# Patient Record
Sex: MALE | Race: WHITE | Employment: FULL TIME | URBAN - METROPOLITAN AREA
[De-identification: names, ages, dates, MRNs, and addresses within clinical notes are randomized per-mention and may not be internally consistent; named-entity substitution may affect disease eponyms.]

---

## 2019-04-30 ENCOUNTER — HOSPITAL ENCOUNTER (OUTPATIENT)
Age: 49
Setting detail: SPECIMEN
Discharge: HOME | End: 2019-04-30
Payer: COMMERCIAL

## 2019-04-30 PROCEDURE — 88342 IMHCHEM/IMCYTCHM 1ST ANTB: CPT

## 2019-05-24 ENCOUNTER — HOSPITAL ENCOUNTER (OUTPATIENT)
Dept: GENERAL RADIOLOGY | Age: 49
Discharge: HOME | End: 2019-05-24
Payer: COMMERCIAL

## 2019-05-24 DIAGNOSIS — K92.2 ACUTE GASTROINTESTINAL HEMORRHAGE: ICD-10-CM

## 2019-05-24 PROCEDURE — 74250 X-RAY XM SM INT 1CNTRST STD: CPT

## 2021-07-09 ENCOUNTER — HOSPITAL ENCOUNTER (EMERGENCY)
Age: 51
Discharge: HOME | End: 2021-07-10
Attending: EMERGENCY MEDICINE
Payer: COMMERCIAL

## 2021-07-09 ENCOUNTER — APPOINTMENT (OUTPATIENT)
Dept: GENERAL RADIOLOGY | Age: 51
End: 2021-07-09
Payer: COMMERCIAL

## 2021-07-09 ENCOUNTER — APPOINTMENT (OUTPATIENT)
Dept: CT IMAGING | Age: 51
End: 2021-07-09
Payer: COMMERCIAL

## 2021-07-09 DIAGNOSIS — I26.99 PULMONARY INFARCTION (HCC): ICD-10-CM

## 2021-07-09 DIAGNOSIS — I26.94 MULTIPLE SUBSEGMENTAL PULMONARY EMBOLI WITHOUT ACUTE COR PULMONALE (HCC): Primary | ICD-10-CM

## 2021-07-09 LAB
ALBUMIN SERPL-MCNC: 3.9 GM/DL (ref 3.4–5)
ALP BLD-CCNC: 81 IU/L (ref 40–129)
ALT SERPL-CCNC: 16 U/L (ref 10–40)
ANION GAP SERPL CALCULATED.3IONS-SCNC: 13 MMOL/L (ref 4–16)
AST SERPL-CCNC: 18 IU/L (ref 15–37)
BASOPHILS ABSOLUTE: 0.1 K/CU MM
BASOPHILS RELATIVE PERCENT: 0.9 % (ref 0–1)
BILIRUB SERPL-MCNC: 0.3 MG/DL (ref 0–1)
BUN BLDV-MCNC: 12 MG/DL (ref 6–23)
CALCIUM SERPL-MCNC: 9.2 MG/DL (ref 8.3–10.6)
CHLORIDE BLD-SCNC: 101 MMOL/L (ref 99–110)
CO2: 24 MMOL/L (ref 21–32)
CREAT SERPL-MCNC: 0.8 MG/DL (ref 0.9–1.3)
D DIMER: 2.14 UG/ML (FEU)
DIFFERENTIAL TYPE: ABNORMAL
EOSINOPHILS ABSOLUTE: 0.1 K/CU MM
EOSINOPHILS RELATIVE PERCENT: 1.5 % (ref 0–3)
GFR AFRICAN AMERICAN: >60 ML/MIN/1.73M2
GFR NON-AFRICAN AMERICAN: >60 ML/MIN/1.73M2
GLUCOSE BLD-MCNC: 126 MG/DL (ref 70–99)
HCT VFR BLD CALC: 37.8 % (ref 42–52)
HEMOGLOBIN: 11.9 GM/DL (ref 13.5–18)
IMMATURE NEUTROPHIL %: 0.3 % (ref 0–0.43)
LACTATE: 0.8 MMOL/L (ref 0.4–2)
LIPASE: 24 IU/L (ref 13–60)
LYMPHOCYTES ABSOLUTE: 1.6 K/CU MM
LYMPHOCYTES RELATIVE PERCENT: 20.5 % (ref 24–44)
MCH RBC QN AUTO: 25.5 PG (ref 27–31)
MCHC RBC AUTO-ENTMCNC: 31.5 % (ref 32–36)
MCV RBC AUTO: 81.1 FL (ref 78–100)
MONOCYTES ABSOLUTE: 0.8 K/CU MM
MONOCYTES RELATIVE PERCENT: 9.9 % (ref 0–4)
PDW BLD-RTO: 15.8 % (ref 11.7–14.9)
PLATELET # BLD: 223 K/CU MM (ref 140–440)
PMV BLD AUTO: 9.7 FL (ref 7.5–11.1)
POTASSIUM SERPL-SCNC: 3.6 MMOL/L (ref 3.5–5.1)
PRO-BNP: 18.53 PG/ML
RBC # BLD: 4.66 M/CU MM (ref 4.6–6.2)
SEGMENTED NEUTROPHILS ABSOLUTE COUNT: 5.2 K/CU MM
SEGMENTED NEUTROPHILS RELATIVE PERCENT: 66.9 % (ref 36–66)
SODIUM BLD-SCNC: 138 MMOL/L (ref 135–145)
TOTAL IMMATURE NEUTOROPHIL: 0.02 K/CU MM
TOTAL PROTEIN: 7.1 GM/DL (ref 6.4–8.2)
TROPONIN T: <0.01 NG/ML
WBC # BLD: 7.8 K/CU MM (ref 4–10.5)

## 2021-07-09 PROCEDURE — 83880 ASSAY OF NATRIURETIC PEPTIDE: CPT

## 2021-07-09 PROCEDURE — 71275 CT ANGIOGRAPHY CHEST: CPT

## 2021-07-09 PROCEDURE — 83605 ASSAY OF LACTIC ACID: CPT

## 2021-07-09 PROCEDURE — 85379 FIBRIN DEGRADATION QUANT: CPT

## 2021-07-09 PROCEDURE — 74177 CT ABD & PELVIS W/CONTRAST: CPT

## 2021-07-09 PROCEDURE — 80053 COMPREHEN METABOLIC PANEL: CPT

## 2021-07-09 PROCEDURE — 84484 ASSAY OF TROPONIN QUANT: CPT

## 2021-07-09 PROCEDURE — 85025 COMPLETE CBC W/AUTO DIFF WBC: CPT

## 2021-07-09 PROCEDURE — 99285 EMERGENCY DEPT VISIT HI MDM: CPT

## 2021-07-09 PROCEDURE — 71045 X-RAY EXAM CHEST 1 VIEW: CPT

## 2021-07-09 PROCEDURE — 96372 THER/PROPH/DIAG INJ SC/IM: CPT

## 2021-07-09 PROCEDURE — 6360000004 HC RX CONTRAST MEDICATION: Performed by: EMERGENCY MEDICINE

## 2021-07-09 PROCEDURE — 83690 ASSAY OF LIPASE: CPT

## 2021-07-09 RX ORDER — PANTOPRAZOLE SODIUM 40 MG/10ML
40 INJECTION, POWDER, LYOPHILIZED, FOR SOLUTION INTRAVENOUS DAILY
COMMUNITY

## 2021-07-09 RX ADMIN — IOPAMIDOL 100 ML: 755 INJECTION, SOLUTION INTRAVENOUS at 23:06

## 2021-07-09 ASSESSMENT — PAIN DESCRIPTION - ORIENTATION: ORIENTATION: RIGHT

## 2021-07-09 ASSESSMENT — PAIN SCALES - GENERAL: PAINLEVEL_OUTOF10: 3

## 2021-07-09 ASSESSMENT — PAIN DESCRIPTION - LOCATION: LOCATION: FLANK

## 2021-07-09 ASSESSMENT — PAIN DESCRIPTION - PAIN TYPE: TYPE: ACUTE PAIN

## 2021-07-09 ASSESSMENT — PAIN DESCRIPTION - DESCRIPTORS: DESCRIPTORS: ACHING

## 2021-07-10 VITALS
WEIGHT: 220 LBS | DIASTOLIC BLOOD PRESSURE: 80 MMHG | RESPIRATION RATE: 18 BRPM | SYSTOLIC BLOOD PRESSURE: 115 MMHG | TEMPERATURE: 98.5 F | BODY MASS INDEX: 31.5 KG/M2 | HEIGHT: 70 IN | OXYGEN SATURATION: 95 % | HEART RATE: 77 BPM

## 2021-07-10 LAB
SARS-COV-2, NAAT: NOT DETECTED
SOURCE: NORMAL

## 2021-07-10 PROCEDURE — 87635 SARS-COV-2 COVID-19 AMP PRB: CPT

## 2021-07-10 PROCEDURE — 6360000002 HC RX W HCPCS: Performed by: EMERGENCY MEDICINE

## 2021-07-10 RX ADMIN — ENOXAPARIN SODIUM 150 MG: 100 INJECTION SUBCUTANEOUS at 00:33

## 2021-07-10 ASSESSMENT — ENCOUNTER SYMPTOMS
CONSTIPATION: 0
NAUSEA: 0
ABDOMINAL PAIN: 1
WHEEZING: 0
EYE REDNESS: 0
RHINORRHEA: 0
DIARRHEA: 0
COUGH: 0
VOMITING: 0
SHORTNESS OF BREATH: 1
SORE THROAT: 0
BACK PAIN: 1

## 2021-07-10 NOTE — ED PROVIDER NOTES
Triage Chief Complaint:   Shortness of Breath    MIGDALIA Hill is a 49 y.o. male that presents with back pain and abdominal pain that initially started on Thursday evening. The back pain is directly behind the location of the abdominal pain in the right upper quadrant. He states he is also short of breath because it hurts if he takes a deep breath. He states this evening after eating dinner he started having intense right upper quadrant pain that radiated into his back and then into his right shoulder. It has been improving since it started but it was very intense initially and wife states he was pale when the pain was intense this evening. He denies any nausea, vomiting or fevers. No chest pain. No history abdominal surgery. He had a history of a kidney stone many years ago but this is not similar. He did have a DVT in 2019 that was unprovoked and was on Xarelto for 3 months but has had no other blood clots. ROS:   Review of Systems   Constitutional: Negative for chills and fever. HENT: Negative for congestion, rhinorrhea and sore throat. Eyes: Negative for redness and visual disturbance. Respiratory: Positive for shortness of breath. Negative for cough and wheezing. Cardiovascular: Negative for chest pain and leg swelling. Gastrointestinal: Positive for abdominal pain. Negative for constipation, diarrhea, nausea and vomiting. Abdominal distention: RUQ. Genitourinary: Negative for dysuria and frequency. Musculoskeletal: Positive for back pain (right back). Negative for arthralgias. Skin: Negative for rash and wound. Neurological: Negative for syncope and headaches. Psychiatric/Behavioral: Negative for hallucinations and suicidal ideas. Past Medical History:   Diagnosis Date    GERD (gastroesophageal reflux disease)      Past Surgical History:   Procedure Laterality Date    KNEE SURGERY       History reviewed. No pertinent family history.   Social History Socioeconomic History    Marital status: Single     Spouse name: Not on file    Number of children: Not on file    Years of education: Not on file    Highest education level: Not on file   Occupational History    Not on file   Tobacco Use    Smoking status: Former Smoker    Smokeless tobacco: Never Used   Substance and Sexual Activity    Alcohol use: Yes     Comment: Weekends    Drug use: Not on file    Sexual activity: Not on file   Other Topics Concern    Not on file   Social History Narrative    Not on file     Social Determinants of Health     Financial Resource Strain:     Difficulty of Paying Living Expenses:    Food Insecurity:     Worried About Running Out of Food in the Last Year:     [address] in the Last Year:    Transportation Needs:     Lack of Transportation (Medical):  Lack of Transportation (Non-Medical):    Physical Activity:     Days of Exercise per Week:     Minutes of Exercise per Session:    Stress:     Feeling of Stress :    Social Connections:     Frequency of Communication with Friends and Family:     Frequency of Social Gatherings with Friends and Family:     Attends [employer] Services:     Active Member of Clubs or Organizations:     Attends Club or Organization Meetings:     Marital Status:    Intimate Partner Violence:     Fear of Current or Ex-Partner:     Emotionally Abused:     Physically Abused:     Sexually Abused:      No current facility-administered medications for this encounter. Current Outpatient Medications   Medication Sig Dispense Refill    apixaban (ELIQUIS) 5 MG TABS tablet Take 2 tablets by mouth 2 times daily for 7 days 28 tablet 0    apixaban (ELIQUIS) 5 MG TABS tablet Take 1 tablet by mouth 2 times daily 60 tablet 0    pantoprazole (PROTONIX) 40 MG injection Infuse 40 mg intravenously daily      famotidine (PEPCID) 20 MG tablet Take 20 mg by mouth daily.        No Known Allergies    Nursing Notes Reviewed     Physical Exam:   ED Triage Vitals [07/09/21 2139]   Enc Vitals Group      BP (!) 164/87      Pulse 98      Resp       Temp       Temp src       SpO2       Weight 220 lb (99.8 kg)      Height 5' 10\" (1.778 m)      Head Circumference       Peak Flow       Pain Score       Pain Loc       Pain Edu? Excl. in [address]? /80   Pulse 77   Temp 98.5 °F (36.9 °C) (Oral)   Resp 18   Ht 5' 10\" (1.778 m)   Wt 220 lb (99.8 kg)   SpO2 95%   BMI 31.57 kg/m²   My pulse ox interpretation is - normal  Physical Exam  Vitals and nursing note reviewed. Constitutional:       General: He is not in acute distress. Appearance: Normal appearance. He is not diaphoretic. HENT:      Head: Normocephalic and atraumatic. Eyes:      General:         Right eye: No discharge. Left eye: No discharge. Conjunctiva/sclera: Conjunctivae normal.   Cardiovascular:      Rate and Rhythm: Normal rate and regular rhythm. Pulses: Normal pulses. Radial pulses are 2+ on the right side and 2+ on the left side. Pulmonary:      Effort: Pulmonary effort is normal. No respiratory distress. Breath sounds: Normal breath sounds. No wheezing or rales. Abdominal:      General: There is no distension. Tenderness: There is abdominal tenderness (minimal in the RUQ). There is no guarding or rebound. Musculoskeletal:         General: No swelling or tenderness. Normal range of motion. Back:    Skin:     General: Skin is warm and dry. Neurological:      General: No focal deficit present. Mental Status: He is alert. Cranial Nerves: No cranial nerve deficit.    Psychiatric:         Mood and Affect: Mood normal.         Behavior: Behavior normal.         I have reviewed and interpreted all of the currently available lab results from this visit (if applicable):  Results for orders placed or performed during the hospital encounter of 07/09/21   COVID-19, Rapid    Specimen: Nasopharyngeal   Result Value Ref Range    Source THROAT     SARS-CoV-2, NAAT NOT DETECTED NOT DETECTED   CBC Auto Differential   Result Value Ref Range    WBC 7.8 4.0 - 10.5 K/CU MM    RBC 4.66 4.6 - 6.2 M/CU MM    Hemoglobin 11.9 (L) 13.5 - 18.0 GM/DL    Hematocrit 37.8 (L) 42 - 52 %    MCV 81.1 78 - 100 FL    MCH 25.5 (L) 27 - 31 PG    MCHC 31.5 (L) 32.0 - 36.0 %    RDW 15.8 (H) 11.7 - 14.9 %    Platelets 598 589 - 991 K/CU MM    MPV 9.7 7.5 - 11.1 FL    Differential Type AUTOMATED DIFFERENTIAL     Segs Relative 66.9 (H) 36 - 66 %    Lymphocytes % 20.5 (L) 24 - 44 %    Monocytes % 9.9 (H) 0 - 4 %    Eosinophils % 1.5 0 - 3 %    Basophils % 0.9 0 - 1 %    Segs Absolute 5.2 K/CU MM    Lymphocytes Absolute 1.6 K/CU MM    Monocytes Absolute 0.8 K/CU MM    Eosinophils Absolute 0.1 K/CU MM    Basophils Absolute 0.1 K/CU MM    Immature Neutrophil % 0.3 0 - 0.43 %    Total Immature Neutrophil 0.02 K/CU MM   Comprehensive Metabolic Panel w/ Reflex to MG   Result Value Ref Range    Sodium 138 135 - 145 MMOL/L    Potassium 3.6 3.5 - 5.1 MMOL/L    Chloride 101 99 - 110 mMol/L    CO2 24 21 - 32 MMOL/L    BUN 12 6 - 23 MG/DL    CREATININE 0.8 (L) 0.9 - 1.3 MG/DL    Glucose 126 (H) 70 - 99 MG/DL    Calcium 9.2 8.3 - 10.6 MG/DL    Albumin 3.9 3.4 - 5.0 GM/DL    Total Protein 7.1 6.4 - 8.2 GM/DL    Total Bilirubin 0.3 0.0 - 1.0 MG/DL    ALT 16 10 - 40 U/L    AST 18 15 - 37 IU/L    Alkaline Phosphatase 81 40 - 129 IU/L    GFR Non-African American >60 >60 mL/min/1.73m2    GFR African American >60 >60 mL/min/1.73m2    Anion Gap 13 4 - 16   Troponin   Result Value Ref Range    Troponin T <0.010 <0.01 NG/ML   Brain Natriuretic Peptide   Result Value Ref Range    Pro-BNP 18.53 <300 PG/ML   Lactic Acid, Plasma   Result Value Ref Range    Lactate 0.8 0.4 - 2.0 mMOL/L   Lipase   Result Value Ref Range    Lipase 24 13 - 60 IU/L   D-Dimer, Rapid   Result Value Ref Range    D-Dimer, Quant 2.14 (H) <0.47 ug/mL (FEU)      Radiographs (if obtained):  [] The following radiograph was interpreted by myself in the absence of a radiologist:  [x]Radiologist's Report Reviewed:  CT ABDOMEN PELVIS W IV CONTRAST Additional Contrast? None   Preliminary Result   1. Bilateral pulmonary emboli with right lower lobe pulmonary infarction. No   CT evidence of right heart strain. 2. No acute intra-abdominal abnormality. Findings were communicated to Dr. Medina Miranda on 07/09/2021 at 11:33 p.m. CTA PULMONARY W CONTRAST   Preliminary Result   1. Bilateral pulmonary emboli with right lower lobe pulmonary infarction. No   CT evidence of right heart strain. 2. No acute intra-abdominal abnormality. Findings were communicated to Dr. Medina Miranda on 07/09/2021 at 11:33 p.m. XR CHEST PORTABLE   Final Result   No acute cardiopulmonary abnormality. EKG (if obtained): (All EKG's are interpreted by myself in the absence of a cardiologist)  Normal sinus rhythm with a rate of 99. WA interval 152, QRS 88, QTc 431. No ST elevations or depressions. Normal T waves. Impression: Normal EKG. When compared to previous EKG from 4/12/2015, there are no significant changes. MDM:  Differential diagnoses considered include but are not limited to renal colic, pyelonephritis, cholecystitis, biliary colic, GERD, peptic ulcer disease, pulmonary embolism, pneumonia, pleuritis. Patient arrives well-appearing and in no acute distress with normal vital signs. Maintaining normal oxygen saturations on room air. Basic labs were obtained and are unremarkable. Patient's EKG is nonconcerning for acute ischemia and troponin is negative. D-dimer is elevated. Due to elevated D-dimer and previous unprovoked DVTs, CT scan of patient's chest was obtained. Due to his abdominal pain a CT scan of his abdomen was also obtained. CT scan showed bilateral pulmonary emboli and a right lower lobe pulmonary infarction. No evidence of right heart strain on CT scan. No acute intra-abdominal abnormality.   I suspect the pulmonary embolism and pulmonary infarct as a cause of patient's pain. His pain is tolerable at this time. He has no signs of heart strain on his EKG and has a normal troponin. Patient has his sons graduation party tomorrow would prefer to be discharged as opposed to staying in the hospital.  He will be given a dose of Lovenox tonight and be started on therapeutic doses of Eliquis tomorrow. We will have him follow close with his primary care physician and with cardiology. He is given the information for cardiology on-call. He was ambulated in the emergency department during which time he maintained oxygen saturations in the mid to high 90s. He did not have any shortness of breath during ambulation. I do believe it is reasonable for him to be discharged home and follow-up closely as an outpatient. If he has any further symptoms or worsening symptoms he will return to the emergency department immediately for further evaluation and treatment. Plan of care explained to patient. Concerning signs and symptoms warranting a return visit to the Emergency Department were explained in detail. All questions and concerns were addressed to the patient's satisfaction. Patient understood and agreed with plan. I did don appropriate PPE (including face mask, protective eye ware/safety glasses and gloves), as recommended by the health facility/national standard best practice, during my bedside interactions with the patient. The likelihood of other entities in the differential is insufficient to justify any further testing for them. This was explained to the patient. The patient was advised that persistent or worsening symptoms would requirefurther evaluation. Clinical Impression:  1.  Multiple subsegmental pulmonary emboli without acute cor pulmonale (HCC)    2. Pulmonary infarction ([address]          Norva Castleman, MD       Please note that portions of this note may have been complete with a voice recognition program. Effortswere made to edit the dictations, but occasional words are mis-transcribed.           Anh Iniguez MD  07/10/21 [id number]

## 2021-07-10 NOTE — ED TRIAGE NOTES
The patient presents with R sided abdominal pain that radiates to his back. He rated the pain @ 5/10.

## 2021-07-10 NOTE — ED NOTES
Assessment completed as charted no S/S of distress noted at this time. We will continue to monitor.        Jose Simpson RN  07/09/21 [id number]

## 2021-07-10 NOTE — PROGRESS NOTES
Patient prepared for and ready to be discharged. Patient discharged at this time in no acute distress after verbalizing understanding of discharge instructions. Patient left after receiving After Visit Summary instructions. Patient understands checking for Covid result on My Chart and informed to isolate if results are positive. Patient educated on return precautions.

## 2021-07-10 NOTE — ED NOTES
Patient ambulated 200 feet on room air , after 200 feet SPO2 was 97% and HR was 100, MD aware     Kristine Navarro RN  07/10/21 0006

## 2021-07-12 ENCOUNTER — INITIAL CONSULT (OUTPATIENT)
Dept: CARDIOLOGY CLINIC | Age: 51
End: 2021-07-12
Payer: COMMERCIAL

## 2021-07-12 VITALS
DIASTOLIC BLOOD PRESSURE: 76 MMHG | HEIGHT: 70 IN | BODY MASS INDEX: 31.98 KG/M2 | HEART RATE: 88 BPM | SYSTOLIC BLOOD PRESSURE: 112 MMHG | WEIGHT: 223.4 LBS

## 2021-07-12 DIAGNOSIS — R07.2 PRECORDIAL PAIN: ICD-10-CM

## 2021-07-12 DIAGNOSIS — I26.99 OTHER ACUTE PULMONARY EMBOLISM WITHOUT ACUTE COR PULMONALE (HCC): Primary | ICD-10-CM

## 2021-07-12 PROCEDURE — 1111F DSCHRG MED/CURRENT MED MERGE: CPT | Performed by: INTERNAL MEDICINE

## 2021-07-12 PROCEDURE — 99204 OFFICE O/P NEW MOD 45 MIN: CPT | Performed by: INTERNAL MEDICINE

## 2021-07-12 NOTE — ASSESSMENT & PLAN NOTE
Bilateral pulmonary embolism. Continue Eliquis 10 mg twice daily for 7 days then 5 mg daily indefinitely for life. Also refer to hematology oncology for hypercoagulable work-up. Get echo to look for right heart strain. Get lower extremity ultrasound to rule out for more DVTs. We had extensive discussion have advised him to use anticoagulation for the rest of his life.

## 2021-07-12 NOTE — PROGRESS NOTES
CARDIOLOGY CONSULT   NOTE    Chief Complaint: Pulmonary embolism    HPI:   Priya Caldera is a 49y.o. year old who has Past medical history as noted below. Chelsie Rhodes works with [employer] in a physically demanding job he had a DVT many years ago and at that time he was placed on anticoagulation for 3 months. He presented to the emergency department after ongoing severe pain in the right side of his rib cage which started after having a meal with pain radiating to the back and getting worse on deep inspiration CT scan in the emergency department showed bilateral PE with right pulmonary infarct. He was started on Eliquis. In the past he did not stay on blood thinners because it made him feel unwell. However he is tolerating Eliquis now. Is never had hypercoagulable work-up. Denies any weight loss. His main complaint is pleuritic pain in the right side of his chest which gets worse with deep inspiration which is still ongoing. Current Outpatient Medications   Medication Sig Dispense Refill    apixaban (ELIQUIS) 5 MG TABS tablet Take 1 tablet by mouth 2 times daily 60 tablet 3    apixaban (ELIQUIS) 5 MG TABS tablet Take 2 tablets by mouth 2 times daily for 7 days 28 tablet 0    pantoprazole (PROTONIX) 40 MG injection Infuse 40 mg intravenously daily       No current facility-administered medications for this visit. Allergies:   Patient has no known allergies. Patient History:  Past Medical History:   Diagnosis Date    GERD (gastroesophageal reflux disease)      Past Surgical History:   Procedure Laterality Date    KNEE SURGERY       History reviewed. No pertinent family history.   Social History     Tobacco Use    Smoking status: Former Smoker    Smokeless tobacco: Never Used   Substance Use Topics    Alcohol use: Yes     Comment: Weekends        Review of Systems:   · Constitutional: No Fever or Weight Loss   · Eyes: No Decreased Vision  · ENT: No Headaches, Hearing Loss or Vertigo  · Cardiovascular: as per note above   · Respiratory: No cough or wheezing and as per note above. · Gastrointestinal: No abdominal pain, appetite loss, blood in stools, constipation, diarrhea or heartburn  · Genitourinary: No dysuria, trouble voiding, or hematuria  · Musculoskeletal:  denies any new  joint aches , swelling  or pain   · Integumentary: No rash or pruritis  · Neurological: No TIA or stroke symptoms  · Psychiatric: No anxiety or depression  · Endocrine: No malaise, fatigue or temperature intolerance  · Hematologic/Lymphatic: No bleeding problems, blood clots or swollen lymph nodes  · Allergic/Immunologic: No nasal congestion or hives    Objective:      Physical Exam:  /76   Pulse 88   Ht 5' 10\" (1.778 m)   Wt 223 lb 6.4 oz (101.3 kg)   BMI 32.05 kg/m²   Wt Readings from Last 3 Encounters:   07/12/21 223 lb 6.4 oz (101.3 kg)   07/09/21 220 lb (99.8 kg)     Body mass index is 32.05 kg/m². Vitals:    07/12/21 1101   BP: 112/76   Pulse: 88        General Appearance:  No distress, conversant  Constitutional:  Well developed, Well nourished, No acute distress, Non-toxic appearance. HENT:  Normocephalic, Atraumatic, Bilateral external ears normal, Oropharynx moist, No oral exudates, Nose normal. Neck- Normal range of motion, No tenderness, Supple, No stridor,no apical-carotid delay  Eyes:  PERRL, EOMI, Conjunctiva normal, No discharge. Respiratory:  Normal breath sounds, No respiratory distress, No wheezing, No chest tenderness. ,no use of accessory muscles, NO crackles  Cardiovascular: (PMI) apex non displaced,no lifts no thrills,S1 and S2 audible, No added heart sounds, No signs of ankle edema, or volume overload, No evidence of JVD, No crackles  GI:  Bowel sounds normal, Soft, No tenderness, No masses, No gross visceromegaly   :  No costovertebral angle tenderness   Musculoskeletal:  No edema, no tenderness, no deformities.  Back- no tenderness  Integument:  Well hydrated, no rash   Lymphatic:  No lymphadenopathy noted   Neurologic:  Alert & oriented x 3, CN 2-12 normal, normal motor function, normal sensory function, no focal deficits noted   Psychiatric:  Speech and behavior appropriate       Medical decision making and Data review:  DATA:  Lab Results   Component Value Date    TROPONINT <0.010 07/09/2021     BNP:    Lab Results   Component Value Date    PROBNP 18.53 07/09/2021     PT/INR:  No results found for: PTINR  No results found for: LABA1C  No results found for: CHOL, TRIG, HDL, LDLCALC, LDLDIRECT  Lab Results   Component Value Date    ALT 16 07/09/2021    AST 18 07/09/2021     Recent Labs     07/09/21  2145   WBC 7.8   HGB 11.9*   HCT 37.8*   MCV 81.1        TSH: No results found for: TSH  Lab Results   Component Value Date    AST 18 07/09/2021    ALT 16 07/09/2021    BILITOT 0.3 07/09/2021    ALKPHOS 81 07/09/2021     Lab Results   Component Value Date    PROBNP 18.53 07/09/2021     No results found for: LABA1C  Lab Results   Component Value Date    WBC 7.8 07/09/2021    HGB 11.9 (L) 07/09/2021    HCT 37.8 (L) 07/09/2021     07/09/2021     All labs, medications and tests reviewed by myself including data and history from outside source , patient and available family . Assessment & Plan:      1. Other acute pulmonary embolism without acute cor pulmonale ([address]    2. Precordial pain         Other pulmonary embolism without acute cor pulmonale (HCC)  Bilateral pulmonary embolism. Continue Eliquis 10 mg twice daily for 7 days then 5 mg daily indefinitely for life. Also refer to hematology oncology for hypercoagulable work-up. Get echo to look for right heart strain. Get lower extremity ultrasound to rule out for more DVTs. We had extensive discussion have advised him to use anticoagulation for the rest of his life. Dyslipidemia :  All available lab work was reviewed. Patient was advised to repeat lab work before next visit.  Necessary orders were placed , instructions given by myself       Counseled extensively and medication compliance urged. We discussed that for the  prevention of ASCVD our  goal is aggressive risk modification. Patient is encouraged to exercise if they can , educated about  brisk walk for 30 minutes  at least 3 to 4 times a week if there are no physical limitations  Various goals were discussed and questions answered. Continue current medications. Appropriate prescriptions are addressed and refills ordered. Questions answered and patient verbalizes understanding. Call for any problems, questions, or concerns. Greater than 60 % of time spent counseling besides reviewing data and images     Continue all other medications of all above medical condition listed as is. Return in about 3 months (around 10/12/2021). Please note this report has been partially produced using speech recognition software and may contain errors related to that system including errors in grammar, punctuation, and spelling, as well as words and phrases that may be inappropriate. If there are any questions or concerns please feel free to contact the dictating provider for clarification.

## 2021-07-14 LAB
EKG ATRIAL RATE: 99 BPM
EKG DIAGNOSIS: NORMAL
EKG P AXIS: 16 DEGREES
EKG P-R INTERVAL: 152 MS
EKG Q-T INTERVAL: 336 MS
EKG QRS DURATION: 88 MS
EKG QTC CALCULATION (BAZETT): 431 MS
EKG R AXIS: -13 DEGREES
EKG T AXIS: 17 DEGREES
EKG VENTRICULAR RATE: 99 BPM

## 2024-04-24 ENCOUNTER — CLINICAL DOCUMENTATION (OUTPATIENT)
Dept: ONCOLOGY | Age: 54
End: 2024-04-24

## 2024-04-24 NOTE — PROGRESS NOTES
Informed [facility] that I have called patient X3 to schedule new patient consult with no return call.

## 2024-09-27 ENCOUNTER — TRANSCRIBE ORDERS (OUTPATIENT)
Dept: ADMINISTRATIVE | Age: 54
End: 2024-09-27

## 2024-09-27 DIAGNOSIS — R07.9 CHEST PAIN, UNSPECIFIED TYPE: ICD-10-CM

## 2024-09-27 DIAGNOSIS — R06.02 SOB (SHORTNESS OF BREATH): Primary | ICD-10-CM

## 2024-09-27 DIAGNOSIS — Z82.49 FAMILY HISTORY OF CORONARY ARTERY DISEASE: ICD-10-CM

## 2024-09-27 DIAGNOSIS — I25.119 ATHEROSCLEROSIS OF NATIVE CORONARY ARTERY OF NATIVE HEART WITH ANGINA PECTORIS (HCC): ICD-10-CM

## 2024-10-01 ENCOUNTER — TRANSCRIBE ORDERS (OUTPATIENT)
Dept: ADMINISTRATIVE | Age: 54
End: 2024-10-01

## 2024-10-01 DIAGNOSIS — R07.9 CHEST PAIN, UNSPECIFIED TYPE: ICD-10-CM

## 2024-10-01 DIAGNOSIS — R06.02 SOB (SHORTNESS OF BREATH): Primary | ICD-10-CM

## 2024-10-01 DIAGNOSIS — I25.119 ATHEROSCLEROSIS OF NATIVE CORONARY ARTERY OF NATIVE HEART WITH ANGINA PECTORIS (HCC): ICD-10-CM

## 2024-10-01 DIAGNOSIS — Z82.49 FAMILY HISTORY OF CORONARY ARTERY DISEASE: ICD-10-CM

## 2024-10-08 ENCOUNTER — HOSPITAL ENCOUNTER (OUTPATIENT)
Dept: CT IMAGING | Age: 54
Discharge: HOME | End: 2024-10-08
Attending: INTERNAL MEDICINE
Payer: COMMERCIAL

## 2024-10-08 DIAGNOSIS — R06.02 SOB (SHORTNESS OF BREATH): ICD-10-CM

## 2024-10-08 DIAGNOSIS — Z82.49 FAMILY HISTORY OF CORONARY ARTERY DISEASE: ICD-10-CM

## 2024-10-08 DIAGNOSIS — R07.9 CHEST PAIN, UNSPECIFIED TYPE: ICD-10-CM

## 2024-10-08 DIAGNOSIS — I25.119 ATHEROSCLEROSIS OF NATIVE CORONARY ARTERY OF NATIVE HEART WITH ANGINA PECTORIS (HCC): ICD-10-CM

## 2024-10-08 PROCEDURE — 3209999900 CT CARDIAC OVER READ

## 2024-10-08 PROCEDURE — 6360000004 HC RX CONTRAST MEDICATION: Performed by: INTERNAL MEDICINE

## 2024-10-08 PROCEDURE — 75574 CT ANGIO HRT W/3D IMAGE: CPT | Performed by: INTERNAL MEDICINE

## 2024-10-08 PROCEDURE — 75574 CT ANGIO HRT W/3D IMAGE: CPT

## 2024-10-08 RX ORDER — IOPAMIDOL 755 MG/ML
144 INJECTION, SOLUTION INTRAVASCULAR
Status: COMPLETED | OUTPATIENT
Start: 2024-10-08 | End: 2024-10-08

## 2024-10-08 RX ADMIN — IOPAMIDOL 144 ML: 755 INJECTION, SOLUTION INTRAVENOUS at 13:08

## 2024-10-08 NOTE — PROCEDURES
CARDIAC FINDINGS:  Cardiac CT scanning was performed, CT angiography during contrast administration using a 128 Scanner using a slice thickness of 0.625 mm and Gantry rotation time of 0.28 sec   Scanning was performed using 120 K  using auto adjustment of mA  Adjustment achieved low dose, retrospective gating was used  Contrast Type: isovue 370   Contrast used: 1cc/lb Max 120 cc     Scan length: Excellent    Left Main Coronary artery:  Left main originates normally from the left coronary sinus and gives rise to the left anterior descending artery and left circumflex artery. It is free of any significant atherosclerotic disease.     Left anterior descending artery:  Left anterior descending artery is a normal caliber vessel that wraps around the apex and gives off  diagonal branches. The diagonal branches are free of any significant atherosclerotic disease. There is / no calcified plaque noted . There are minor luminal irregularities noted.    Left circumflex artery:  Left circumflex artery is a moderate size vessel that gives off a marginal branch. The left circumflex artery is free of any significant atherosclerotic disease.     Right coronary artery:  Right coronary artery originates from the right coronary sinus. It seems to be the dominant vessel and gives rise to the posterior descending artery and posterior ventricular artery. The RCA is moderate size vessel.  Proximal to mid segment of the right coronary artery is not seen. Posterior descending artery and posterior lateral branches are free of any significant disease.    Pericardium:  Pericardium is normal in appearance without any thickening, calcification or pericardial fluid.    Cardiac structures:  The ascending aorta is measured at 36.4 mm.    Interpretation summary:  There is no significant coronary artery disease identified.   Left anterior descending artery and circumflex artery are widely patent  Proximal right coronary artery is not seen but